# Patient Record
Sex: MALE | Race: WHITE | Employment: FULL TIME | URBAN - NONMETROPOLITAN AREA
[De-identification: names, ages, dates, MRNs, and addresses within clinical notes are randomized per-mention and may not be internally consistent; named-entity substitution may affect disease eponyms.]

---

## 2018-01-26 ENCOUNTER — HOSPITAL ENCOUNTER (OUTPATIENT)
Dept: RADIOLOGY | Facility: OTHER | Age: 28
End: 2018-01-26
Attending: FAMILY MEDICINE

## 2018-01-26 ENCOUNTER — HISTORY (OUTPATIENT)
Dept: FAMILY MEDICINE | Facility: OTHER | Age: 28
End: 2018-01-26

## 2018-01-26 ENCOUNTER — OFFICE VISIT - GICH (OUTPATIENT)
Dept: FAMILY MEDICINE | Facility: OTHER | Age: 28
End: 2018-01-26

## 2018-01-26 DIAGNOSIS — R05.9 COUGH: ICD-10-CM

## 2018-01-26 DIAGNOSIS — Z02.0 ENCOUNTER FOR EXAMINATION FOR ADMISSION TO EDUCATIONAL INSTITUTION: ICD-10-CM

## 2018-01-26 DIAGNOSIS — J18.9 PNEUMONIA: ICD-10-CM

## 2018-01-26 ASSESSMENT — ANXIETY QUESTIONNAIRES
1. FEELING NERVOUS, ANXIOUS, OR ON EDGE: NOT AT ALL
7. FEELING AFRAID AS IF SOMETHING AWFUL MIGHT HAPPEN: NOT AT ALL
2. NOT BEING ABLE TO STOP OR CONTROL WORRYING: NOT AT ALL
3. WORRYING TOO MUCH ABOUT DIFFERENT THINGS: NOT AT ALL
5. BEING SO RESTLESS THAT IT IS HARD TO SIT STILL: NOT AT ALL
6. BECOMING EASILY ANNOYED OR IRRITABLE: NOT AT ALL
4. TROUBLE RELAXING: NOT AT ALL
GAD7 TOTAL SCORE: 0

## 2018-01-26 ASSESSMENT — PATIENT HEALTH QUESTIONNAIRE - PHQ9: SUM OF ALL RESPONSES TO PHQ QUESTIONS 1-9: 0

## 2018-01-31 LAB
BORDETELLA BY RAPID PCR - HISTORICAL: NEGATIVE
BORDETELLA PARAPERTUSSIS PCR - HISTORICAL: NEGATIVE
SPECIMEN SOURCE - HISTORICAL: NORMAL

## 2018-02-02 ENCOUNTER — COMMUNICATION - GICH (OUTPATIENT)
Dept: FAMILY MEDICINE | Facility: OTHER | Age: 28
End: 2018-02-02

## 2018-02-09 ENCOUNTER — COMMUNICATION - GICH (OUTPATIENT)
Dept: FAMILY MEDICINE | Facility: OTHER | Age: 28
End: 2018-02-09

## 2018-02-09 VITALS
HEART RATE: 72 BPM | BODY MASS INDEX: 29.34 KG/M2 | HEIGHT: 68 IN | SYSTOLIC BLOOD PRESSURE: 112 MMHG | WEIGHT: 193.6 LBS | DIASTOLIC BLOOD PRESSURE: 68 MMHG

## 2018-02-11 ASSESSMENT — PATIENT HEALTH QUESTIONNAIRE - PHQ9: SUM OF ALL RESPONSES TO PHQ QUESTIONS 1-9: 0

## 2018-02-11 ASSESSMENT — ANXIETY QUESTIONNAIRES: GAD7 TOTAL SCORE: 0

## 2018-02-13 NOTE — PROGRESS NOTES
Patient Information     Patient Name MRN Sex Ruy Nation [id number] Male 1990      Progress Notes by Jacinto Currie MD at 2018  3:00 PM     Author:  Jacinto Currie MD Service:  (none) Author Type:  Physician     Filed:  2018  7:55 AM Encounter Date:  2018 Status:  Signed     :  Jacinto Currie MD (Physician)            SUBJECTIVE:    Ruy Irene is a 27 y.o. male who presents for physical cold for 2 weeks    HPI Comments: Patient arrives here for a report. He'll be participating in [employer]. He needs a form signed basically stating he can do the activity required. This form was signed. He also reports an active cough. Fevers and chills. Cold for the last 2 days. He's been in bed has not been able to get up. Fever has since resolved but the cough has persisted.  He reports dizziness. Trouble breathing. He coughs almost to the point of vomiting. He does have some exposure to pertussis. Otherwise he is fairly active. He has no problems with general physical activity. States has not been able to exercise.      No Known Allergies,   Family History       Problem   Relation Age of Onset     Diabetes        Maternal side       Hypertension        Paternal side       Hyperlipidemia        Paternal side, hyperlipidemia       Heart Disease  Maternal Grandfather      CAD, MI before age 55     ,   No current outpatient prescriptions on file prior to visit.     No current facility-administered medications on file prior to visit.    , No past surgical history on file.,   Social History      Substance Use Topics        Smoking status:  Never Smoker     Smokeless tobacco:  Never Used     Alcohol use  1.5 oz/week     3 drink(s) per week     and   Social History      Substance Use Topics        Smoking status:  Never Smoker     Smokeless tobacco:  Never Used     Alcohol use  1.5 oz/week     3 drink(s) per week        REVIEW OF SYSTEMS:  ROS    OBJECTIVE:  BP  "112/68 (Cuff Site: Right Arm, Position: Sitting, Cuff Size: Adult Large)  Pulse 72  Ht 1.727 m (5' 8\")  Wt 87.8 kg (193 lb 9.6 oz)  BMI 29.44 kg/m2    EXAM:   Physical Exam   Constitutional: He is oriented to person, place, and time and well-developed, well-nourished, and in no distress.   Patient's very well-built. Looks to be in excellent physical shape   HENT:   Rhinorrhea bilateral   Eyes: Pupils are equal, round, and reactive to light.   Neck: Normal range of motion.   Cardiovascular: Normal rate.    Pulmonary/Chest: Effort normal. He has wheezes.   Abdominal: Soft.   Musculoskeletal: Normal range of motion.   Neurological: He is alert and oriented to person, place, and time.   Psychiatric: Affect normal.       ASSESSMENT/PLAN:    ICD-10-CM    1. School health examination Z02.0    2. Coughing R05 XR CHEST 2 VIEWS PA AND LATERAL      BORDETELLA BY RAPID PCR      BORDETELLA BY RAPID PCR      azithromycin (ZITHROMAX) 250 mg tablet   3. 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 Form is signed. We'll proceed with Zithromax. Rule out pertussis. Reports he has had exposure. Follow-up when the test gets back.        "

## 2018-02-13 NOTE — NURSING NOTE
Patient Information     Patient Name MRN Ruy Cartagena [id number] Male 1990      Nursing Note by Katerina Freeman at 2018  3:00 PM     Author:  Katerina Freeman Service:  (none) Author Type:  (none)     Filed:  2018  3:02 PM Encounter Date:  2018 Status:  Signed     :  Katerina Freeman            Patient here for physical clearance for [employer] PT entrance standard. He also has an URI for the past 2 weeks. Katerina Freeman LPN .......................2018  2:58 PM

## 2018-02-13 NOTE — TELEPHONE ENCOUNTER
Patient Information     Patient Name Ruy Garcia [id number] Male 1990      Telephone Encounter by Katerina Freeman at 2018 11:21 AM     Author:  Katerina Freeman Service:  (none) Author Type:  (none)     Filed:  2018 11:22 AM Encounter Date:  2018 Status:  Signed     :  Katerina Freeman            Spoke with patient, negative result but still not feeling well. He just finished ZPak did let him know that the medication continues to work in the body for 10 days. He has to work today, but will call back early next week if not feeling any better. Katerina Freeman LPN .......................2018  11:22 AM

## 2018-02-13 NOTE — TELEPHONE ENCOUNTER
Patient Information     Patient Name MRN Ruy Cartagena [id number] Male 1990      Telephone Encounter by Annie Stevens at 2018  3:22 PM     Author:  Annie Stevens Service:  (none) Author Type:  (none)     Filed:  2018  3:25 PM Encounter Date:  2018 Status:  Signed     :  Annie Stevens            Was seen for cough, put on a z-ana and he is not feeling better, has been ten days, wondering if he could get another small dose to get him through this cold?  Annie Stevens ....................  2018   3:25 PM

## 2018-02-13 NOTE — TELEPHONE ENCOUNTER
Patient Information     Patient Name MRN Ruy Cartagena [id number] Male 1990      Telephone Encounter by Paola Colin at 2018  3:40 PM     Author:  Paola Colin Service:  (none) Author Type:  (none)     Filed:  2018  3:41 PM Encounter Date:  2018 Status:  Signed     :  Paola Colin            Patient notified to make an appointment if the cough is not getting better per Jacinto Currie MD.  Paola Colin LPN  2018  3:41 PM

## 2018-07-23 NOTE — PROGRESS NOTES
Patient Information     Patient Name  Ruy Irene MRN  [id number] Sex  Male   1990      Letter by Jacinto Currie MD at      Author:  Jacinto Currie MD Service:  (none) Author Type:  (none)    Filed:   Encounter Date:  2018 Status:  (Other)           Ruy Irene  [address] [place] [address]          2018    Dear Mr. Irene:    I did receive the report for pertussis and it did come back negative. I have included the results for your review. Please call if you should have any questions.  Results for orders placed or performed in visit on 18       BORDETELLA BY RAPID PCR       Result  Value Ref Range Status    SPECIMEN SOURCE Nasopharyngeal swab  Final    BORDETELLA BY RAPID PCR Negative Not Applicable Final    BORDETELLA PARAPERTUSSIS PCR Negative Not Applicable Final     Jacinto Currie MD ....................  2018   1:37 PM

## 2019-05-09 ENCOUNTER — OFFICE VISIT (OUTPATIENT)
Dept: FAMILY MEDICINE | Facility: OTHER | Age: 29
End: 2019-05-09
Attending: FAMILY MEDICINE
Payer: COMMERCIAL

## 2019-05-09 VITALS
SYSTOLIC BLOOD PRESSURE: 144 MMHG | RESPIRATION RATE: 20 BRPM | DIASTOLIC BLOOD PRESSURE: 76 MMHG | BODY MASS INDEX: 31.07 KG/M2 | HEART RATE: 64 BPM | TEMPERATURE: 98.8 F | HEIGHT: 68 IN | WEIGHT: 205 LBS

## 2019-05-09 DIAGNOSIS — Z11.3 SCREEN FOR STD (SEXUALLY TRANSMITTED DISEASE): Primary | ICD-10-CM

## 2019-05-09 LAB
C TRACH DNA SPEC QL PROBE+SIG AMP: NOT DETECTED
N GONORRHOEA DNA SPEC QL PROBE+SIG AMP: NOT DETECTED
SPECIMEN SOURCE: NORMAL

## 2019-05-09 PROCEDURE — 87491 CHLMYD TRACH DNA AMP PROBE: CPT | Mod: ZL | Performed by: FAMILY MEDICINE

## 2019-05-09 PROCEDURE — 87591 N.GONORRHOEAE DNA AMP PROB: CPT | Mod: ZL | Performed by: FAMILY MEDICINE

## 2019-05-09 PROCEDURE — 87389 HIV-1 AG W/HIV-1&-2 AB AG IA: CPT | Mod: ZL | Performed by: FAMILY MEDICINE

## 2019-05-09 PROCEDURE — 86696 HERPES SIMPLEX TYPE 2 TEST: CPT | Mod: ZL | Performed by: FAMILY MEDICINE

## 2019-05-09 PROCEDURE — 36415 COLL VENOUS BLD VENIPUNCTURE: CPT | Mod: ZL | Performed by: FAMILY MEDICINE

## 2019-05-09 PROCEDURE — 86695 HERPES SIMPLEX TYPE 1 TEST: CPT | Mod: ZL | Performed by: FAMILY MEDICINE

## 2019-05-09 PROCEDURE — 86780 TREPONEMA PALLIDUM: CPT | Mod: ZL | Performed by: FAMILY MEDICINE

## 2019-05-09 PROCEDURE — 99213 OFFICE O/P EST LOW 20 MIN: CPT | Performed by: FAMILY MEDICINE

## 2019-05-09 ASSESSMENT — PAIN SCALES - GENERAL: PAINLEVEL: NO PAIN (0)

## 2019-05-09 ASSESSMENT — PATIENT HEALTH QUESTIONNAIRE - PHQ9
SUM OF ALL RESPONSES TO PHQ QUESTIONS 1-9: 0
5. POOR APPETITE OR OVEREATING: NOT AT ALL

## 2019-05-09 ASSESSMENT — ANXIETY QUESTIONNAIRES
2. NOT BEING ABLE TO STOP OR CONTROL WORRYING: NOT AT ALL
7. FEELING AFRAID AS IF SOMETHING AWFUL MIGHT HAPPEN: NOT AT ALL
6. BECOMING EASILY ANNOYED OR IRRITABLE: NOT AT ALL
3. WORRYING TOO MUCH ABOUT DIFFERENT THINGS: NOT AT ALL
1. FEELING NERVOUS, ANXIOUS, OR ON EDGE: NOT AT ALL
GAD7 TOTAL SCORE: 0
5. BEING SO RESTLESS THAT IT IS HARD TO SIT STILL: NOT AT ALL

## 2019-05-09 ASSESSMENT — MIFFLIN-ST. JEOR: SCORE: 1879.37

## 2019-05-09 NOTE — NURSING NOTE
Patient here for STD testing his last testing was 2 years prior. Itching is his only symptom today. Medication Reconciliation: complete.    Katerina Freeman LPN  5/9/2019 4:28 PM

## 2019-05-09 NOTE — LETTER
May 13, 2019      Juan Ajazz Irene     [place] [address]        Dear ,    We are writing to inform you of your test results.    As you can see laboratory indicates that you have been exposed to herpes.  I still do not feel that this was a herpetic lesion.    Resulted Orders   GC/Chlamydia by PCR - [facility]   Result Value Ref Range    Specimen Source Urine     Neisseria gonorrhoreae PCR Not Detected NDET^Not Detected      Comment:      NOT DETECTED: Negative for N.gonorrhoeae genomic DNA by [facility]   real-time,reverse-transcriptase PCR. A negative result does not preclude the   presence of N.gonorrhoeae infection. The results are dependent on proper   collection,transport,processing of the specimen,and the presence of sufficient   DNA to be detected.      Chlamydia Trachomatis PCR Not Detected NDET^Not Detected      Comment:      NOTDETECTED: Negative for C.trachomatis genomic DNA by [facility]   real-time,reverse-transcriptase PCR. A negative result does not preclude the   presence of C.trachomatis infection. The results are dependent on proper   collection,transpoet,processing of specimen, and the presence of sufficient   DNA to be detected.     Herpes Simplex Virus 1 and 2 IgG   Result Value Ref Range    Herpes Simplex Virus Type 1 IgG 1.6 (H) 0.0 - 0.8 AI      Comment:      Positive.  IgG antibody to HSV-1 detected.  Antibody index (AI) values reflect qualitative changes in antibody   concentration that cannot be directly associated with clinical condition or   disease state.      Herpes Simplex Virus Type 2 IgG <0.2 0.0 - 0.8 AI      Comment:      No HSV-2 IgG antibodies detected.  Antibody index (AI) values reflect qualitative changes in antibody   concentration that cannot be directly associated with clinical condition or   disease state.     Treponema Abs w Reflex to RPR and Titer   Result Value Ref Range    Treponema Antibodies Nonreactive NR^Nonreactive       If you have any questions or  concerns, please call the clinic at the number listed above.       Sincerely,        Jacinto Currie MD

## 2019-05-10 ASSESSMENT — ANXIETY QUESTIONNAIRES: GAD7 TOTAL SCORE: 0

## 2019-05-10 NOTE — PROGRESS NOTES
"  SUBJECTIVE:   Ruy Irene is a 28 year old male who presents to clinic today for the following health issues: STD testing    Patient arrives here requesting STD testing.  He reports no known exposures but indicates that he wants everything done.  He does complain of some itching on the penis.  He is noticed one small papule that he is concerned might be a wart.  Last testing was 2 years ago.  Patient does report multiple sexual partners in the past.  He denies any high risk sexual partners such as prostitutes.  No same-sex partners of.        Patient Active Problem List    Diagnosis Date Noted     Screen for STD (sexually transmitted disease) 2019     Priority: Medium     Past Medical History:   Diagnosis Date     Single live birth          Varicella without complication           History reviewed. No pertinent surgical history.  No Known Allergies    Review of Systems     OBJECTIVE:     /76   Pulse 64   Temp 98.8  F (37.1  C)   Resp 20   Ht 1.727 m (5' 8\")   Wt 93 kg (205 lb)   BMI 31.17 kg/m    Body mass index is 31.17 kg/m .  Physical Exam   Constitutional: He appears well-developed.   HENT:   Head: Normocephalic.   Right Ear: External ear normal.   Genitourinary: Penis normal.   Genitourinary Comments: Patient does have one small papule less than a millimeter on the shaft of the penis.   Musculoskeletal: Normal range of motion.   Neurological: He is alert.       Diagnostic Test Results:  Results for orders placed or performed in visit on 19   GC/Chlamydia by PCR - [facility]   Result Value Ref Range    Specimen Source Urine     Neisseria gonorrhoreae PCR Not Detected NDET^Not Detected    Chlamydia Trachomatis PCR Not Detected NDET^Not Detected       ASSESSMENT/PLAN:         1. Screen for STD (sexually transmitted disease)  Desire for sexual's STD screening.  Reassurance is given to the patient that I felt that this was not a wart or herpes.  Patient wishes to pursue testing " anyway.  We will get back to patient as the labs returned.  - GC/Chlamydia by PCR - [facility]  - HIV Antigen Antibody Combo; Future  - Treponema Abs w Reflex to RPR and Titer; Future  - Herpes Simplex Virus 1 and 2 IgG; Future  - Herpes Simplex Virus 1 and 2 IgG  - Treponema Abs w Reflex to RPR and Titer  - HIV Antigen Antibody Combo      Jacinto Currie MD  [facility]

## 2019-05-12 LAB
HSV1 IGG SERPL QL IA: 1.6 AI (ref 0–0.8)
HSV2 IGG SERPL QL IA: <0.2 AI (ref 0–0.8)
T PALLIDUM AB SER QL: NONREACTIVE

## 2019-05-13 LAB — HIV 1+2 AB+HIV1 P24 AG SERPL QL IA: NONREACTIVE

## 2019-05-20 ENCOUNTER — TELEPHONE (OUTPATIENT)
Dept: FAMILY MEDICINE | Facility: OTHER | Age: 29
End: 2019-05-20

## 2019-05-21 NOTE — TELEPHONE ENCOUNTER
Patient calling in regards to being positive for type 1 herpes. He is wondering how he could have contracted this. I did explain this to the best of my ability. He is wondering if there is any treatment he can take preventively so he doesn't spread the virus. He is aware Dr. Currie is out of the office and will return tomorrow.     Keri Mckee LPN...................5/21/2019  10:55 AM

## 2019-05-22 NOTE — TELEPHONE ENCOUNTER
I would recommend getting information on the Internet and if he has any further questions to follow-up in clinic

## 2019-05-22 NOTE — TELEPHONE ENCOUNTER
Patient wants to know if it could have been spread to him sexually or if he could spread it to someone else through sexual activity.

## 2019-07-30 ENCOUNTER — APPOINTMENT (OUTPATIENT)
Dept: GENERAL RADIOLOGY | Facility: OTHER | Age: 29
End: 2019-07-30
Attending: EMERGENCY MEDICINE
Payer: OTHER MISCELLANEOUS

## 2019-07-30 ENCOUNTER — HOSPITAL ENCOUNTER (EMERGENCY)
Facility: OTHER | Age: 29
Discharge: HOME | End: 2019-07-30
Attending: EMERGENCY MEDICINE | Admitting: EMERGENCY MEDICINE
Payer: OTHER MISCELLANEOUS

## 2019-07-30 VITALS
OXYGEN SATURATION: 98 % | WEIGHT: 205 LBS | SYSTOLIC BLOOD PRESSURE: 128 MMHG | RESPIRATION RATE: 12 BRPM | HEART RATE: 51 BPM | TEMPERATURE: 97.1 F | BODY MASS INDEX: 31.07 KG/M2 | DIASTOLIC BLOOD PRESSURE: 88 MMHG | HEIGHT: 68 IN

## 2019-07-30 DIAGNOSIS — S62.353A CLOSED NONDISPLACED FRACTURE OF SHAFT OF THIRD METACARPAL BONE OF LEFT HAND, INITIAL ENCOUNTER: ICD-10-CM

## 2019-07-30 PROCEDURE — 99283 EMERGENCY DEPT VISIT LOW MDM: CPT | Mod: Z6 | Performed by: EMERGENCY MEDICINE

## 2019-07-30 PROCEDURE — 99283 EMERGENCY DEPT VISIT LOW MDM: CPT | Performed by: EMERGENCY MEDICINE

## 2019-07-30 PROCEDURE — 73130 X-RAY EXAM OF HAND: CPT | Mod: LT

## 2019-07-30 ASSESSMENT — ENCOUNTER SYMPTOMS
VOMITING: 0
NAUSEA: 0
ARTHRALGIAS: 1
CHILLS: 0
CHEST TIGHTNESS: 0
LIGHT-HEADEDNESS: 0
FEVER: 0
DYSURIA: 0
SHORTNESS OF BREATH: 0
AGITATION: 0

## 2019-07-30 ASSESSMENT — MIFFLIN-ST. JEOR: SCORE: 1879.37

## 2019-07-30 NOTE — ED PROVIDER NOTES
History     Chief Complaint   Patient presents with     Hand Injury     HPI  Ruy Irene is a 28 year old male who was at work today was holding a fairly heavy metal pipe and lost control of it and he want up having his hand struck between the pipe and some more metal.  He said he felt something tear.  He points to the distal third and fourth metacarpal area where he has some swelling.  He says when he just sits and does not move his hand he has minimal pain but if he tries to pick anything up he has severe sharp shooting pain.    Allergies:  No Known Allergies    Problem List:    Patient Active Problem List    Diagnosis Date Noted     Screen for STD (sexually transmitted disease) 05/09/2019     Priority: Medium        Past Medical History:    Past Medical History:   Diagnosis Date     Single live birth      Varicella without complication        Past Surgical History:    History reviewed. No pertinent surgical history.    Family History:    Family History   Problem Relation Age of Onset     Diabetes Other         Diabetes,Maternal side     Hypertension Other         Hypertension,Paternal side     Hyperlipidemia Other         Hyperlipidemia,Paternal side, hyperlipidemia     Heart Disease Maternal Grandfather         Heart Disease,CAD, MI before age 55       Social History:  Marital Status:  Single [1]  Social History     Tobacco Use     Smoking status: Current Some Day Smoker     Smokeless tobacco: Never Used   Substance Use Topics     Alcohol use: Yes     Alcohol/week: 1.5 oz     Comment: 2 a week      Drug use: Never     Types: Other     Comment: Drug use: No        Medications:      No current outpatient medications on file.      Review of Systems   Constitutional: Negative for chills and fever.   HENT: Negative for congestion.    Eyes: Negative for visual disturbance.   Respiratory: Negative for chest tightness and shortness of breath.    Cardiovascular: Negative for chest pain.   Gastrointestinal:  "Negative for nausea and vomiting.   Genitourinary: Negative for dysuria.   Musculoskeletal: Positive for arthralgias.   Skin: Negative for rash.   Neurological: Negative for light-headedness.   Psychiatric/Behavioral: Negative for agitation.       Physical Exam   BP: 128/88  Pulse: 51  Temp: 97.1  F (36.2  C)  Resp: 12  Height: 172.7 cm (5' 8\")  Weight: 93 kg (205 lb)  SpO2: 98 %      Physical Exam   Constitutional: He is oriented to person, place, and time. He appears well-developed and well-nourished. No distress.   HENT:   Head: Normocephalic and atraumatic.   Eyes: Conjunctivae are normal.   Neck: Neck supple.   Cardiovascular: Normal rate.   Pulmonary/Chest: Effort normal.   Musculoskeletal:   Left hand does have some swelling  dorsally.  Little bit of tenderness to the distal third metacarpal area.  Pain with deviation to radial side, otherwise good range of motion.  No obvious deformity.  Neurovascularly intact.   Neurological: He is alert and oriented to person, place, and time.   Skin: Skin is warm and dry. He is not diaphoretic.   Psychiatric: He has a normal mood and affect. His behavior is normal.   Nursing note and vitals reviewed.      ED Course        Procedures                 Results for orders placed or performed during the hospital encounter of 07/30/19 (from the past 24 hour(s))   XR Hand Left G/E 3 Views    Narrative    XR HAND LT G/E 3 VW    HISTORY: 28 years Male pain mid metacarpals . Pain is greatest at the  distal aspect of the third metacarpal.    COMPARISON: 9/10/2011    TECHNIQUE: 3 views left hand    FINDINGS: There is an oblique fracture, nondisplaced, nonangulated of  the third mid metacarpal.    Joint spaces are congruent. There is no evidence of dislocation or  fracture otherwise.     There is calcification or corticated ossicle along the ulnar aspect  of the second metacarpal head. There is dorsal soft tissue edema of  the hand.       Impression    IMPRESSION: Dorsal soft tissue " edema of the hand is present. There is  a subtle, nondisplaced, nonangulated obliquely oriented fracture of  the mid third metacarpal.    Corticated ossicle along the ulnar aspect of the second metacarpal  head. This may be sequela of old injury, was seen on the prior study  of 2011.    NORMAN ALARCON MD       Medications - No data to display    Assessments & Plan (with Medical Decision Making)     I have reviewed the nursing notes.    I have reviewed the findings, diagnosis, plan and need for follow up with the patient.  Fracture as above in x-ray.  He is given a splint for this hand.  He is told he should follow-up in clinic for recheck within a week's time and possible cast placement.  Return if worse.       Medication List      There are no discharge medications for this visit.         Final diagnoses:   Closed nondisplaced fracture of shaft of third metacarpal bone of left hand, initial encounter       7/30/2019   [facility]     Nba Whyte MD  07/30/19 [id number]

## 2019-07-30 NOTE — ED AVS SNAPSHOT
[facility]  [address]  [place] [address]  Phone:  [phone]  Fax:  [phone]                                    Ruy Irene   MRN: [id number]    Department:  [facility]   Date of Visit:  7/30/2019           After Visit Summary Signature Page    I have received my discharge instructions, and my questions have been answered. I have discussed any challenges I see with this plan with the nurse or doctor.    ..........................................................................................................................................  Patient/Patient Representative Signature      ..........................................................................................................................................  Patient Representative Print Name and Relationship to Patient    ..................................................               ................................................  Date                                   Time    ..........................................................................................................................................  Reviewed by Signature/Title    ...................................................              ..............................................  Date                                               Time          22EPIC Rev 08/18

## 2019-07-30 NOTE — LETTER
[facility]  [address]  [place] [address]  Phone: [phone]  Fax: [phone]    July 30, 2019        Ruy Irene  [address]   [facility] [address]          To whom it may concern:    RE: Ruy Irene  has a fracture of his hand and will need light duty until cleared in clinic.    Please contact me for questions or concerns.      Sincerely,        Nba Whyte MD  7/30/2019, 11:22 AM

## 2019-07-30 NOTE — ED TRIAGE NOTES
"ED Nursing Triage Note (General)   ________________________________    Juan Ajazz Irene is a 28 year old Male that presents to triage private car  With history of  Injuring left hand at work, unable to make a fist, strength is minimal, reported by patient   Significant symptoms had onset 1 hour(s) ago.  /88   Pulse 51   Temp 97.1  F (36.2  C) (Tympanic)   Resp 12   Ht 1.727 m (5' 8\")   Wt 93 kg (205 lb)   SpO2 98%   BMI 31.17 kg/m  t  Patient appears alert , in mild distress., and cooperative behavior.      Airway: intact  Breathing noted as Normal.  Circulation Normal  Skin normal  Action taken:  Triage to critical care immediately      PRE HOSPITAL PRIOR LIVING SITUATION Alone  "

## 2019-08-01 ENCOUNTER — OFFICE VISIT (OUTPATIENT)
Dept: FAMILY MEDICINE | Facility: OTHER | Age: 29
End: 2019-08-01
Attending: CHIROPRACTOR
Payer: OTHER MISCELLANEOUS

## 2019-08-01 VITALS
RESPIRATION RATE: 16 BRPM | DIASTOLIC BLOOD PRESSURE: 80 MMHG | BODY MASS INDEX: 31.64 KG/M2 | HEART RATE: 72 BPM | WEIGHT: 208.8 LBS | SYSTOLIC BLOOD PRESSURE: 130 MMHG | HEIGHT: 68 IN | TEMPERATURE: 98.7 F
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
## 2019-08-01 DIAGNOSIS — S62.309A CLOSED FRACTURE OF METACARPAL BONE: Primary | ICD-10-CM

## 2019-08-01 PROCEDURE — 99213 OFFICE O/P EST LOW 20 MIN: CPT | Performed by: CHIROPRACTOR

## 2019-08-01 ASSESSMENT — MIFFLIN-ST. JEOR: SCORE: 1896.61

## 2019-08-01 ASSESSMENT — PAIN SCALES - GENERAL: PAINLEVEL: MODERATE PAIN (4)

## 2019-08-01 NOTE — NURSING NOTE
Patient here to follow up on the fracture of his 3rd metacarpal in the left hand. This is a work comp injury. DATE OF INJURY: 7/30/19.  No LMP for male patient.  Medication Reconciliation: complete   Review Of Systems  Skin: negative  Eyes: negative  Ears/Nose/Throat: negative  Respiratory: No shortness of breath, dyspnea on exertion, cough, or hemoptysis  Cardiovascular: negative  Gastrointestinal: negative  Genitourinary: negative  Musculoskeletal: positive for as above  Neurologic: negative  Psychiatric: negative  Hematologic/Lymphatic/Immunologic: negative  Endocrine: negative        Katerina Hutton LPN  8/1/2019 1:07 PM

## 2019-08-01 NOTE — PROGRESS NOTES
CHIEF COMPLAINT: Ruy Irene is a 28 year old  male  Chief Complaint   Patient presents with     Work Comp     left hand fracture       HISTORY OF PRESENTING INJURY     This is a work-related injury Ruy sustained on 2019.  He works as an  at [employer].  During his shift, he was attempting to lower a 25 pound piece of metal in the shape of a long sole from a shelf contraption in front of him.  As he picked up the object and started lowering it, it slipped and he attempted to catch the object before it fell.  In doing so, his left hand became wedged between the object and the table he was working at.  He continued to working after the incident but swelling ensued and he was having difficulty lifting objects without marked pain.  Therefore he presented himself to the ED.  X-rays were obtained which showed a nondisplaced oblique fracture of the third metacarpal on his left hand.  A plastic splint was applied with Ace bandage and he was instructed follow-up with the clinic for workability and management.        PAST MEDICAL HISTORY:  Past Medical History:   Diagnosis Date     Single live birth          Varicella without complication          Patient has a history of fracture to the first metatarsal of the left hand greater than 5 years ago while playing football.  This is healed and he denies any impairment as a result.  However, on x-ray obtained on 2019, there was an accessory ossicle at the PIP joint, noncontributory to this injury.    PAST SURGICAL HISTORY:  History reviewed. No pertinent surgical history.    ALLERGIES:  No Known Allergies    CURRENT MEDICATIONS:  No current outpatient medications on file.       SOCIAL HISTORY:  Social History     Socioeconomic History     Marital status: Single     Spouse name: Not on file     Number of children: Not on file     Years of education: Not on file     Highest education level: Not on file   Occupational History     Not on  file   Social Needs     Financial resource strain: Not on file     Food insecurity:     Worry: Not on file     Inability: Not on file     Transportation needs:     Medical: Not on file     Non-medical: Not on file   Tobacco Use     Smoking status: Current Some Day Smoker     Smokeless tobacco: Never Used   Substance and Sexual Activity     Alcohol use: Yes     Alcohol/week: 1.5 oz     Comment: 2 a week      Drug use: Never     Sexual activity: Yes     Partners: Female   Lifestyle     Physical activity:     Days per week: Not on file     Minutes per session: Not on file     Stress: Not on file   Relationships     Social connections:     Talks on phone: Not on file     Gets together: Not on file     Attends Shinto service: Not on file     Active member of club or organization: Not on file     Attends meetings of clubs or organizations: Not on file     Relationship status: Not on file     Intimate partner violence:     Fear of current or ex partner: Not on file     Emotionally abused: Not on file     Physically abused: Not on file     Forced sexual activity: Not on file   Other Topics Concern     Not on file   Social History Narrative    Lives with mother, brother and step father.  Parents .   Inderjit Martinezford     Mother Kimi Deejay.     Brother Gonzalo Villalba cell #[phone]    Brother Gonzalo Alcazarnchard cell #[phone]       FAMILY HISTORY:  Family History   Problem Relation Age of Onset     Diabetes Other         Diabetes,Maternal side     Hypertension Other         Hypertension,Paternal side     Hyperlipidemia Other         Hyperlipidemia,Paternal side, hyperlipidemia     Heart Disease Maternal Grandfather         Heart Disease,CAD, MI before age 55       REVIEW OF SYSTEMS:    Nursing Notes:   Katerina Hutton LPN  8/1/2019  1:21 PM  Signed  Patient here to follow up on the fracture of his 3rd metacarpal in the left hand. This is a work comp  "injury. DATE OF INJURY: 7/30/19.  No LMP for male patient.  Medication Reconciliation: complete   Review Of Systems  Skin: negative  Eyes: negative  Ears/Nose/Throat: negative  Respiratory: No shortness of breath, dyspnea on exertion, cough, or hemoptysis  Cardiovascular: negative  Gastrointestinal: negative  Genitourinary: negative  Musculoskeletal: positive for as above  Neurologic: negative  Psychiatric: negative  Hematologic/Lymphatic/Immunologic: negative  Endocrine: negative        Katerina Hutton LPN  8/1/2019 1:07 PM    I have reviewed the ROS    PHYSICAL EXAM:   /80   Pulse 72   Temp 98.7  F (37.1  C) (Tympanic)   Resp 16   Ht 1.727 m (5' 8\")   Wt 94.7 kg (208 lb 12.8 oz)   BMI 31.75 kg/m   Body mass index is 31.75 kg/m .    General Appearance: No acute distress.  He removed the splint and Ace bandage for our observation.  There continues to be swelling on the dorsal hand.  No abrasion on the skin.      Hand:  Sensation: Intact.  Radial and ulnar blood flow:  normal.  Did not perform  strength measurement at this time.  Opposition to the thumb was able be performed and he has adequate range of motion of the wrist.    Radiographic images from his ED visit on July 30, 2019 were independently reviewed and discussed with the patient.        IMPRESSION:    Nondisplaced oblique fracture of the third metacarpal on his left hand    PLAN:    The splint applied from ED is appropriate for this injury.  We reapplied with a larger Ace bandage with instruction to him.  Follow-up in 4 weeks with x-ray to evaluate healing.  Work restrictions were applied until then.  See scanned workability form dated today.  Patient agrees with the workability and the plan going forward and his signature on the form indicating so.      Greater than 50% of this 22-minute encounter was spent in counseling and coordination of care regarding the above condition.        Kavon Lehman DC  Director - Occupational Medicine " Department  [facility]  [address] [place] [address]  Phone [phone]  Fax [phone]    Disclaimer:  This note consists of words and symbols derived from keyboarding, dictation, or using voice recognition software. As a result, there may be errors in the script that have gone undetected. Please consider this when interpreting information found in this note.    1:55 PM 8/1/2019

## 2019-08-29 ENCOUNTER — OFFICE VISIT (OUTPATIENT)
Dept: FAMILY MEDICINE | Facility: OTHER | Age: 29
End: 2019-08-29
Attending: CHIROPRACTOR
Payer: OTHER MISCELLANEOUS

## 2019-08-29 ENCOUNTER — HOSPITAL ENCOUNTER (OUTPATIENT)
Dept: GENERAL RADIOLOGY | Facility: OTHER | Age: 29
Discharge: HOME | End: 2019-08-29
Attending: CHIROPRACTOR | Admitting: CHIROPRACTOR
Payer: OTHER MISCELLANEOUS

## 2019-08-29 VITALS
RESPIRATION RATE: 16 BRPM | BODY MASS INDEX: 31.46 KG/M2 | DIASTOLIC BLOOD PRESSURE: 78 MMHG | WEIGHT: 207.6 LBS | SYSTOLIC BLOOD PRESSURE: 132 MMHG | HEIGHT: 68 IN | HEART RATE: 60 BPM | TEMPERATURE: 97.4 F

## 2019-08-29 DIAGNOSIS — S62.309A CLOSED FRACTURE OF METACARPAL BONE: Primary | ICD-10-CM
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
## 2019-08-29 DIAGNOSIS — S62.309A CLOSED FRACTURE OF METACARPAL BONE: ICD-10-CM

## 2019-08-29 PROCEDURE — 73130 X-RAY EXAM OF HAND: CPT | Mod: LT

## 2019-08-29 PROCEDURE — 99213 OFFICE O/P EST LOW 20 MIN: CPT | Performed by: CHIROPRACTOR

## 2019-08-29 ASSESSMENT — PAIN SCALES - GENERAL: PAINLEVEL: NO PAIN (1)

## 2019-08-29 ASSESSMENT — MIFFLIN-ST. JEOR: SCORE: 1883.23

## 2019-08-29 NOTE — PROGRESS NOTES
CHIEF COMPLAINT: Ruy Irene is a 28 year old  male  Chief Complaint   Patient presents with     Work Comp     L. hand fracture       HISTORY OF PRESENTING INJURY     Ruy presents today for fracture follow up.  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  He continues to wear his splint but hasn't been icing as instructed.  His workplace has been very cooperative with his restrictions.      PAST MEDICAL HISTORY:  Past Medical History:   Diagnosis Date     Single live birth          Varicella without complication            PAST SURGICAL HISTORY:  History reviewed. No pertinent surgical history.    ALLERGIES:  No Known Allergies    CURRENT MEDICATIONS:  No current outpatient medications on file.       SOCIAL HISTORY:  Social History     Socioeconomic History     Marital status: Single     Spouse name: Not on file     Number of children: Not on file     Years of education: Not on file     Highest education level: Not on file   Occupational History     Not on file   Social Needs     Financial resource strain: Not on file     Food insecurity:     Worry: Not on file     Inability: Not on file     Transportation needs:     Medical: Not on file     Non-medical: Not on file   Tobacco Use     Smoking status: Current Some Day Smoker     Smokeless tobacco: Never Used   Substance and Sexual Activity     Alcohol use: Yes     Alcohol/week: 1.5 oz     Comment: 2 a week      Drug use: Never     Sexual activity: Yes     Partners: Female   Lifestyle     Physical activity:     Days per week: Not on file     Minutes per session: Not on file     Stress: Not on file   Relationships     Social connections:     Talks on phone: Not on file     Gets together: Not on file     Attends Evangelical service: Not on file     Active member of club or organization: Not on file     Attends meetings of clubs or organizations: Not on file     " Relationship status: Not on file     Intimate partner violence:     Fear of current or ex partner: Not on file     Emotionally abused: Not on file     Physically abused: Not on file     Forced sexual activity: Not on file   Other Topics Concern     Not on file   Social History Narrative    Lives with mother, brother and step father.  Parents .   Inderjit Irene     Mother Kimi Villalba.     Brother Gonzalo Villalba cell #[phone]    Brother Gonzalo Villalba cell #[phone]       FAMILY HISTORY:  Family History   Problem Relation Age of Onset     Diabetes Other         Diabetes,Maternal side     Hypertension Other         Hypertension,Paternal side     Hyperlipidemia Other         Hyperlipidemia,Paternal side, hyperlipidemia     Heart Disease Maternal Grandfather         Heart Disease,CAD, MI before age 55       REVIEW OF SYSTEMS:    No change in ROS from 8/1/19    PHYSICAL EXAM:   /78 (BP Location: Right arm, Patient Position: Sitting, Cuff Size: Adult Regular)   Pulse 60   Temp 97.4  F (36.3  C) (Tympanic)   Resp 16   Ht 1.715 m (5' 7.5\")   Wt 94.2 kg (207 lb 9.6 oz)   BMI 32.03 kg/m   Body mass index is 32.03 kg/m .    General Appearance: No acute distress.  Continued marked swelling dorsum of left hand.    Study Result     PROCEDURE: XR HAND LT G/E 3 VW 8/29/2019 1:14 PM     HISTORY: Closed fracture of metacarpal bone; Work place accident     COMPARISONS: 7/30/2019.     TECHNIQUE: 3 views.     FINDINGS: There is a long oblique fracture through the proximal shaft  of the third metacarpal with increasing callus formation at the  fracture site.     There is old fracture of the index finger metacarpal head along the  ulnar side.                                                                        IMPRESSION: Healing third metacarpal fracture.     VERNON HERNANDEZ MD         Radiographic images were independently reviewed and discussed " with the patient.        IMPRESSION/PLAN:    I am concerned with the findings of this follow up x-ray with regards to the oblique fracture.  Therefore, placed referral to [facility] for [facility] follow up.  Will extend his restrictions as is with end date TBD based on results of OA visit.      Greater than 50% of this 24 minute encounter was spent in counseling and coordination of care regarding the above condition.        Kavon Lehman DC  Director - Occupational Medicine Department  [address] [address]  Phone [phone]  Fax [phone]    Disclaimer:  This note consists of words and symbols derived from keyboarding, dictation, or using voice recognition software. As a result, there may be errors in the script that have gone undetected. Please consider this when interpreting information found in this note.    2:05 PM 8/29/2019

## 2019-08-29 NOTE — NURSING NOTE
"Chief Complaint   Patient presents with     Work Comp     L. hand fracture       Initial /78 (BP Location: Right arm, Patient Position: Sitting, Cuff Size: Adult Regular)   Pulse 60   Temp 97.4  F (36.3  C) (Tympanic)   Resp 16   Ht 1.715 m (5' 7.5\")   Wt 94.2 kg (207 lb 9.6 oz)   BMI 32.03 kg/m   Estimated body mass index is 32.03 kg/m  as calculated from the following:    Height as of this encounter: 1.715 m (5' 7.5\").    Weight as of this encounter: 94.2 kg (207 lb 9.6 oz).  Medication Reconciliation: Completed     Kayla Bond LPN  "

## 2019-09-16 ENCOUNTER — TRANSFERRED RECORDS (OUTPATIENT)
Dept: HEALTH INFORMATION MANAGEMENT | Facility: OTHER | Age: 29
End: 2019-09-16

## 2019-10-14 DIAGNOSIS — S62.308A CLOSED FRACTURE OF 3RD METACARPAL: ICD-10-CM

## 2019-10-14 DIAGNOSIS — S62.313G: Primary | ICD-10-CM

## 2019-10-16 ENCOUNTER — OFFICE VISIT (OUTPATIENT)
Dept: ORTHOPEDICS | Facility: OTHER | Age: 29
End: 2019-10-16
Attending: ORTHOPAEDIC SURGERY
Payer: OTHER MISCELLANEOUS

## 2019-10-16 ENCOUNTER — HOSPITAL ENCOUNTER (OUTPATIENT)
Dept: GENERAL RADIOLOGY | Facility: OTHER | Age: 29
Discharge: HOME | End: 2019-10-16
Attending: ORTHOPAEDIC SURGERY | Admitting: FAMILY MEDICINE
Payer: OTHER MISCELLANEOUS

## 2019-10-16 DIAGNOSIS — Z00.00 ROUTINE GENERAL MEDICAL EXAMINATION AT A HEALTH CARE FACILITY: Primary | ICD-10-CM

## 2019-10-16 DIAGNOSIS — S62.308A CLOSED FRACTURE OF 3RD METACARPAL: ICD-10-CM

## 2019-10-16 PROCEDURE — G0463 HOSPITAL OUTPT CLINIC VISIT: HCPCS | Performed by: ORTHOPAEDIC SURGERY

## 2019-10-16 PROCEDURE — 73130 X-RAY EXAM OF HAND: CPT | Mod: LT

## 2019-10-23 NOTE — PROGRESS NOTES
CHIEF COMPLAINT: Left Third Metacarpal Fracture Recheck    PROBLEMS:  Left hand pain (ICD-729.5) (RFF97-I88.642)    PATIENT REPORTED MEDICATIONS:   Patient has no noted medications.    PATIENT REPORTED ALLERGIES:   Patient has no noted allergies.      HISTORY OF PRESENT ILLNESS:    Reason For Evaluation:   Left Hand Third Metacarpal Fracture    History:  Ruy comes kathy 2  months out from third metacarpal fracture.  She is doing well as far as that is concerned.   She really has no concerns.    She is here for new x-rays and examination at this time.    PAST MEDICAL HISTORY:    Unremarkable    PAST ORTHOPEDIC SURGICAL HISTORY:    No Past Orthopedic Surgical History    PAST SURGICAL HISTORY:    No Past Surgical History    FAMILY HISTORY:    Fraternal - Heart Attacks, Diabetes Type 2    SOCIAL HISTORY:   Employed - [employer]/    PHYSICAL EXAMINATION:    Examination of the left hand shows good range of motion, minimal pain with palpation.  Neurovascular examination is otherwise intact.    X-RAY:  Three views of the hand does show healed third metacarpal midshaft fracture.    ASSESSMENT:    Third metacarpal midshaft fracture left side, doing well    PLAN:   Release to work without limitations.    No functional limitations noted.  He has MMI.  Followup examination on a p.r.n. basis.    Dictated by Tyler Machado M.D.  D:  10/16/19  T:   10/22/19    Copy to:  Jacinto Currie MD     Typed and/or reviewed and corrected by signing  below, and sent to the Physician for final review and signature.     This report was created using voice recording software and computer-generated templates. Although every effort has been made to review for and eliminate errors, some errors may still occur.         Electronically signed by Patricia David  on 10/22/2019 at 10:57 AM    Electronically signed by Tyler Machado MD on 10/22/2019 at 11:59  AM  ________________________________________________________________________

## 2020-07-14 ENCOUNTER — VIRTUAL VISIT (OUTPATIENT)
Dept: FAMILY MEDICINE | Facility: OTHER | Age: 30
End: 2020-07-14
Attending: PHYSICIAN ASSISTANT
Payer: COMMERCIAL

## 2020-07-14 ENCOUNTER — ALLIED HEALTH/NURSE VISIT (OUTPATIENT)
Dept: FAMILY MEDICINE | Facility: OTHER | Age: 30
End: 2020-07-14
Attending: FAMILY MEDICINE
Payer: COMMERCIAL

## 2020-07-14 DIAGNOSIS — Z20.822 EXPOSURE TO COVID-19 VIRUS: Primary | ICD-10-CM

## 2020-07-14 PROCEDURE — C9803 HOPD COVID-19 SPEC COLLECT: HCPCS

## 2020-07-14 PROCEDURE — 99212 OFFICE O/P EST SF 10 MIN: CPT | Mod: TEL | Performed by: FAMILY MEDICINE

## 2020-07-14 PROCEDURE — 99207 ZZC NO CHARGE LOS: CPT

## 2020-07-14 PROCEDURE — U0003 INFECTIOUS AGENT DETECTION BY NUCLEIC ACID (DNA OR RNA); SEVERE ACUTE RESPIRATORY SYNDROME CORONAVIRUS 2 (SARS-COV-2) (CORONAVIRUS DISEASE [COVID-19]), AMPLIFIED PROBE TECHNIQUE, MAKING USE OF HIGH THROUGHPUT TECHNOLOGIES AS DESCRIBED BY CMS-2020-01-R: HCPCS | Mod: ZL | Performed by: FAMILY MEDICINE

## 2020-07-14 ASSESSMENT — PAIN SCALES - GENERAL: PAINLEVEL: NO PAIN (0)

## 2020-07-14 NOTE — NURSING NOTE
"Chief Complaint   Patient presents with     Covid 19 Testing     Patient was exposed at work to someone who was around a positive patient and his work is making them all get tested in able to return to work.    Initial There were no vitals taken for this visit. Estimated body mass index is 32.03 kg/m  as calculated from the following:    Height as of 8/29/19: 1.715 m (5' 7.5\").    Weight as of 8/29/19: 94.2 kg (207 lb 9.6 oz).    Medication Reconciliation: complete      Sarah Paredes LPN  "

## 2020-07-14 NOTE — PROGRESS NOTES
"Ruy Irene is a 29 year old male who is being evaluated via a billable telephone visit.      The patient has been notified of following: Sarah Paredes LPN .......  7/14/2020  11:25 AM        \"This telephone visit will be conducted via a call between you and your physician/provider. We have found that certain health care needs can be provided without the need for a physical exam.  This service lets us provide the care you need with a short phone conversation.  If a prescription is necessary we can send it directly to your pharmacy.  If lab work is needed we can place an order for that and you can then stop by our lab to have the test done at a later time.    Telephone visits are billed at different rates depending on your insurance coverage. During this emergency period, for some insurers they may be billed the same as an in-person visit.  Please reach out to your insurance provider with any questions.    If during the course of the call the physician/provider feels a telephone visit is not appropriate, you will not be charged for this service.\"    Patient has given verbal consent for Telephone visit?  Yes    What phone number would you like to be contacted at? [phone]    How would you like to obtain your AVS? Mail a copy  Nursing Notes:   Sarah Paredes LPN  7/14/2020 11:29 AM  Signed  Chief Complaint   Patient presents with     Covid 19 Testing     Patient was exposed at work to someone who was around a positive patient and his work is making them all get tested in able to return to work.    Initial There were no vitals taken for this visit. Estimated body mass index is 32.03 kg/m  as calculated from the following:    Height as of 8/29/19: 1.715 m (5' 7.5\").    Weight as of 8/29/19: 94.2 kg (207 lb 9.6 oz).    Medication Reconciliation: complete      Sarah Paredes LPN    Subjective     Ruy Irene is a 29 year old male who presents via phone visit today for the following health " "issues:    Told by employer he needs testing for COVID. No direct contact with known positive but required and all off work for testing.  Denies symptoms            HPI           Review of Systems   Constitutional, HEENT, cardiovascular, pulmonary, gi and gu systems are negative, except as otherwise noted.       Objective   Reported vitals:    Vital signs:                         Estimated body mass index is 32.03 kg/m  as calculated from the following:    Height as of 8/29/19: 1.715 m (5' 7.5\").    Weight as of 8/29/19: 94.2 kg (207 lb 9.6 oz).   COVID test done.      Assessment/Plan:  1. Exposure to COVID-19 virus    - Asymptomatic COVID-19 Virus (Coronavirus) by PCR    Plan:  COVID test per request of employer pending. 2-3 days to result.       Phone call duration:  5 minutes    Suzan Lucio MD      "

## 2020-07-16 LAB
SARS-COV-2 RNA SPEC QL NAA+PROBE: NOT DETECTED
SPECIMEN SOURCE: NORMAL

## 2020-11-13 ENCOUNTER — VIRTUAL VISIT (OUTPATIENT)
Dept: FAMILY MEDICINE | Facility: OTHER | Age: 30
End: 2020-11-13
Attending: PHYSICIAN ASSISTANT
Payer: COMMERCIAL

## 2020-11-13 DIAGNOSIS — Z20.822 COVID-19 RULED OUT: Primary | ICD-10-CM

## 2020-11-13 PROCEDURE — 99212 OFFICE O/P EST SF 10 MIN: CPT | Mod: TEL | Performed by: PHYSICIAN ASSISTANT

## 2020-11-13 NOTE — NURSING NOTE
Patient had 2 coworkers test positive and work is requesting him to be tested.  Marielena Funes LPN ....................  11/13/2020   12:32 PM

## 2020-11-13 NOTE — PROGRESS NOTES
"Ruy Irene is a 30 year old male who is being evaluated via a billable telephone visit.      The patient has been notified of following:     \"This telephone visit will be conducted via a call between you and your physician/provider. We have found that certain health care needs can be provided without the need for a physical exam.  This service lets us provide the care you need with a short phone conversation.  If a prescription is necessary we can send it directly to your pharmacy.  If lab work is needed we can place an order for that and you can then stop by our lab to have the test done at a later time.    Telephone visits are billed at different rates depending on your insurance coverage. During this emergency period, for some insurers they may be billed the same as an in-person visit.  Please reach out to your insurance provider with any questions.    If during the course of the call the physician/provider feels a telephone visit is not appropriate, you will not be charged for this service.\"    Patient has given verbal consent for Telephone visit?  Yes    What phone number would you like to be contacted at? [phone]    How would you like to obtain your AVS? Chloe Frey     Ruy Irene is a 30 year old male who presents via phone visit today for the following health issues:    HPI     Patient is contacted via telephone for consideration of COVID-19 testing. States he works in close contact with two co-workers who recently tested positive for COVID. One tested positive of Monday and the other on Tuesday. Patient is currently asymptomatic. No fever/chills, cough, sore throat, shortness of breath, wheezing, muscle or body aches, headaches, GI symptoms, rash.         PAST MEDICAL HISTORY:   Past Medical History:   Diagnosis Date     Single live birth          Varicella without complication            PAST SURGICAL HISTORY: No past surgical history on file.    FAMILY HISTORY: "   Family History   Problem Relation Age of Onset     Diabetes Other         Diabetes,Maternal side     Hypertension Other         Hypertension,Paternal side     Hyperlipidemia Other         Hyperlipidemia,Paternal side, hyperlipidemia     Heart Disease Maternal Grandfather         Heart Disease,CAD, MI before age 55       SOCIAL HISTORY:   Social History     Tobacco Use     Smoking status: Current Some Day Smoker     Smokeless tobacco: Never Used   Substance Use Topics     Alcohol use: Yes     Alcohol/week: 2.5 standard drinks     Comment: 2 a week       No Known Allergies  No current outpatient medications on file.     No current facility-administered medications for this visit.          Review of Systems   Constitutional, HEENT, cardiovascular, pulmonary, gi and gu systems are negative, except as otherwise noted.       Objective          Vitals:  No vitals were obtained today due to virtual visit.    healthy, alert and no distress  PSYCH: Alert and oriented times 3; coherent speech, normal   rate and volume, able to articulate logical thoughts, able   to abstract reason, no tangential thoughts, no hallucinations   or delusions  His affect is normal  RESP: No cough, no audible wheezing, able to talk in full sentences  Remainder of exam unable to be completed due to telephone visits            Assessment/Plan:  1. COVID-19 ruled out      Patient meets criteria for COVID-19 testing. They are informed to self quarantine until results are available. They have been provided information to complete curbside testing. Will notify with results. If positive, patient is to self-quarantine at home for 14 days.       Phone call duration:  5 minutes    Italia Schaefer PA-C on 11/13/2020 at 12:51 PM

## 2020-11-13 NOTE — PATIENT INSTRUCTIONS
"Discharge Instructions for COVID-19 Patients  You have--or may have--COVID-19. Please follow the instructions listed below.   If you have a weakened immune system, discuss with your doctor any other actions you need to take.  How can I protect others?  If you have symptoms (fever, cough, body aches or trouble breathing):    Stay home and away from others (self-isolate) until:  ? At least 10 days have passed since your symptoms started, And   ? You've had no fever--and no medicine that reduces fever--for 1 full day (24 hours), And    ? Your other symptoms have resolved (gotten better).  If you don't show symptoms, but testing showed that you have COVID-19:    Stay home and away from others (self-isolate). Follow the tips under \"How do I self-isolate?\" below for 10 days (20 days if you have a weak immune system).    You don't need to be retested for COVID-19 before going back to school or work. As long as you're fever-free and feeling better, you can go back to school, work and other activities after waiting the 10 or 20 days.   How do I self-isolate?    Stay in your own room, even for meals. Use your own bathroom if you can.    Stay away from others in your home. No hugging, kissing or shaking hands. No visitors.    Don't go to work, school or anywhere else.    Clean \"high touch\" surfaces often (doorknobs, counters, handles). Use household cleaning spray or wipes. You'll find a full list of  on the EPA website: www.epa.gov/pesticide-registration/list-n-disinfectants-use-against-sars-cov-2.    Cover your mouth and nose with a mask or other face covering to avoid spreading germs.    Wash your hands and face often. Use soap and water.    Caregivers in these groups are at risk for severe illness due to COVID-19:  ? People 65 years and older  ? People who live in a nursing home or long-term care facility  ? People with chronic disease (lung, heart, cancer, diabetes, kidney, liver, immunologic)  ? People who have a " weakened immune system, including those who:    Are in cancer treatment    Take medicine that weakens the immune system, such as corticosteroids    Had a bone marrow or organ transplant    Have an immune deficiency    Have poorly controlled HIV or AIDS    Are obese (body mass index of 40 or higher)    Smoke regularly    Caregivers should wear gloves while washing dishes, handling laundry and cleaning bedrooms and bathrooms.    Use caution when washing and drying laundry: Don't shake dirty laundry and use the warmest water setting that you can.    For more tips on managing your health at home, go to www.cdc.gov/coronavirus/2019-ncov/downloads/10Things.pdf.  How can I take care of myself at home?  1. Get lots of rest. Drink extra fluids (unless a doctor has told you not to).    2. Take Tylenol (acetaminophen) for fever or pain. If you have liver or kidney problems, ask your family doctor if it's okay to take Tylenol.     Adults can take either:  ? 650 mg (two 325 mg pills) every 4 to 6 hours, or   ? 1,000 mg (two 500 mg pills) every 8 hours as needed.  ? Note: Don't take more than 3,000 mg in one day. Acetaminophen is found in many medicines (both prescribed and over-the-counter medicines). Read all labels to be sure you don't take too much.   For children, check the Tylenol bottle for the right dose. The dose is based on the child's age or weight.  3. If you have other health problems (like cancer, heart failure, an organ transplant or severe kidney disease): Call your specialty clinic if you don't feel better in the next 2 days.    4. Know when to call 911. Emergency warning signs include:  ? Trouble breathing or shortness of breath  ? Pain or pressure in the chest that doesn't go away  ? Feeling confused like you haven't felt before, or not being able to wake up  ? Bluish-colored lips or face    5. Your doctor may have prescribed a blood thinner medicine. Follow their instructions.  Where can I get more  information?    [facility] - About COVID-19: [email or web address]    CDC - What to Do If You're Sick: www.cdc.gov/coronavirus/2019-ncov/about/steps-when-sick.html    CDC - Ending Home Isolation: www.cdc.gov/coronavirus/2019-ncov/hcp/disposition-in-home-patients.html    CDC - Caring for Someone: www.cdc.gov/coronavirus/2019-ncov/if-you-are-sick/care-for-someone.html    [facility] - Interim Guidance for Hospital Discharge to Home: www.health.[facility].[place]./diseases/coronavirus/hcp/hospdischarge.pdf    [facility] clinical trials (COVID-19 research studies): clinicalaffairs.[facility]-clinical-trials    Below are the COVID-19 hotlines at the [place] Department of [facility] ([facility]). Interpreters are available.  ? For health questions: Call 597-127-4917 or 1-812.826.6597 (7 a.m. to 7 p.m.)  ? For questions about schools and childcare: Call 670-269-2003 or 1-500.188.2963 (7 a.m. to 7 p.m.)    For informational purposes only. Not to replace the advice of your health care provider. Clinically reviewed by the Infection Prevention Team. Copyright   2020 [employer]. All rights reserved. [employer] [id number] - REV 08/04/20.

## 2020-11-14 ENCOUNTER — ALLIED HEALTH/NURSE VISIT (OUTPATIENT)
Dept: FAMILY MEDICINE | Facility: OTHER | Age: 30
End: 2020-11-14
Attending: PHYSICIAN ASSISTANT
Payer: COMMERCIAL

## 2020-11-14 DIAGNOSIS — Z20.822 COVID-19 RULED OUT: ICD-10-CM

## 2020-11-14 PROCEDURE — C9803 HOPD COVID-19 SPEC COLLECT: HCPCS

## 2020-11-14 PROCEDURE — U0003 INFECTIOUS AGENT DETECTION BY NUCLEIC ACID (DNA OR RNA); SEVERE ACUTE RESPIRATORY SYNDROME CORONAVIRUS 2 (SARS-COV-2) (CORONAVIRUS DISEASE [COVID-19]), AMPLIFIED PROBE TECHNIQUE, MAKING USE OF HIGH THROUGHPUT TECHNOLOGIES AS DESCRIBED BY CMS-2020-01-R: HCPCS | Mod: ZL | Performed by: PHYSICIAN ASSISTANT

## 2020-11-14 PROCEDURE — 99207 PR NO CHARGE NURSE ONLY: CPT

## 2020-11-15 LAB
SARS-COV-2 RNA SPEC QL NAA+PROBE: NOT DETECTED
SPECIMEN SOURCE: NORMAL

## 2020-12-20 ENCOUNTER — HEALTH MAINTENANCE LETTER (OUTPATIENT)
Age: 30
End: 2020-12-20

## 2021-01-25 ENCOUNTER — OFFICE VISIT (OUTPATIENT)
Dept: FAMILY MEDICINE | Facility: OTHER | Age: 31
End: 2021-01-25
Attending: PHYSICIAN ASSISTANT
Payer: COMMERCIAL

## 2021-01-25 VITALS
HEIGHT: 68 IN | HEART RATE: 62 BPM | SYSTOLIC BLOOD PRESSURE: 126 MMHG | BODY MASS INDEX: 30.62 KG/M2 | RESPIRATION RATE: 20 BRPM | OXYGEN SATURATION: 98 % | WEIGHT: 202 LBS | DIASTOLIC BLOOD PRESSURE: 80 MMHG | TEMPERATURE: 97.6 F

## 2021-01-25 DIAGNOSIS — Z11.3 ROUTINE SCREENING FOR STI (SEXUALLY TRANSMITTED INFECTION): ICD-10-CM

## 2021-01-25 DIAGNOSIS — K60.2 ANAL FISSURE: Primary | ICD-10-CM

## 2021-01-25 DIAGNOSIS — G47.30 SLEEP APNEA, UNSPECIFIED TYPE: ICD-10-CM

## 2021-01-25 DIAGNOSIS — Z11.4 ENCOUNTER FOR SCREENING FOR HIV: ICD-10-CM

## 2021-01-25 LAB
C TRACH DNA SPEC QL NAA+PROBE: NOT DETECTED
HIV1+2 AB SPEC QL IA.RAPID: NONREACTIVE
N GONORRHOEA DNA SPEC QL NAA+PROBE: NOT DETECTED
SPECIMEN SOURCE: NORMAL

## 2021-01-25 PROCEDURE — 87491 CHLMYD TRACH DNA AMP PROBE: CPT | Mod: ZL | Performed by: PHYSICIAN ASSISTANT

## 2021-01-25 PROCEDURE — 87591 N.GONORRHOEAE DNA AMP PROB: CPT | Mod: ZL | Performed by: PHYSICIAN ASSISTANT

## 2021-01-25 PROCEDURE — 36415 COLL VENOUS BLD VENIPUNCTURE: CPT | Mod: ZL | Performed by: PHYSICIAN ASSISTANT

## 2021-01-25 PROCEDURE — 99214 OFFICE O/P EST MOD 30 MIN: CPT | Performed by: PHYSICIAN ASSISTANT

## 2021-01-25 PROCEDURE — 86703 HIV-1/HIV-2 1 RESULT ANTBDY: CPT | Mod: ZL | Performed by: PHYSICIAN ASSISTANT

## 2021-01-25 RX ORDER — DOCUSATE SODIUM 250 MG
250 CAPSULE ORAL DAILY
Qty: 30 CAPSULE | Refills: 1 | Status: SHIPPED | OUTPATIENT
Start: 2021-01-25

## 2021-01-25 RX ORDER — NITROGLYCERIN 4 MG/G
1 OINTMENT RECTAL EVERY 12 HOURS
Qty: 30 G | Refills: 1 | Status: SHIPPED | OUTPATIENT
Start: 2021-01-25

## 2021-01-25 ASSESSMENT — PAIN SCALES - GENERAL: PAINLEVEL: SEVERE PAIN (6)

## 2021-01-25 ASSESSMENT — MIFFLIN-ST. JEOR: SCORE: 1855.77

## 2021-01-25 NOTE — NURSING NOTE
"Chief Complaint   Patient presents with     STD     check and abdominal pain    Patient says he has some bleeding with urination, abdominal pain ongoing and some light headedness . Reports abdominal pain has been over 1 year.     Initial /80 (BP Location: Right arm, Patient Position: Sitting, Cuff Size: Adult Large)   Pulse 62   Temp 97.6  F (36.4  C) (Temporal)   Resp 20   Ht 1.727 m (5' 8\")   Wt 91.6 kg (202 lb)   SpO2 98%   BMI 30.71 kg/m   Estimated body mass index is 30.71 kg/m  as calculated from the following:    Height as of this encounter: 1.727 m (5' 8\").    Weight as of this encounter: 91.6 kg (202 lb).  Medication Reconciliation: complete    Jocelyn Saeed LPN  "

## 2021-01-25 NOTE — PROGRESS NOTES
"SUBJECTIVE:   HPI  Ruy Irene is a 30 year old male here for multiple issues.    Anal fissure  Bright red blood in rectal pain with defecation.  This is been going on for approximately 1 year.  He has not felt any hemorrhoids or bulges in the anal area.  He has noted bright red streaking and blood in the water of the toilet.  He is already change his diet to soften his stools and avoided straining and still notes symptoms.  He has never noticed any bulging or hemorrhoids.    STI testing  Patient also requesting gonorrhea chlamydia and HIV testing as he has a new partner.  He denies any symptoms such as dysuria, hematuria, pyuria, testicular pain or pressure, abdominal pain or pressure, weakness or fatigue.    Sleep apnea  Patient also states he has a history of sleep apnea and used to have a machine with prior diagnosis.  He states in relocation approximately 2 years ago lost his machine during the moving process.  After stopping use of his machine he has not noticed any increased daytime sleepiness but is been told that he snores quite a lot.  He has also noticed after waking up from midday naps and he feels slightly short of breath and almost gasping.  He is requesting sleep medicine referral for reevaluation.    GAD7  STEFANO-7 SCORE 1/26/2018 5/9/2019   Total Score 0 0       PHQ9  PHQ 1/26/2018 5/9/2019   PHQ-9 Total Score 0 0   Q9: Thoughts of better off dead/self-harm past 2 weeks Not at all Not at all     Allergies:  No Known Allergies    Review of Systems   As above otherwise ROS is unremarkable.     OBJECTIVE:     Vitals:    01/25/21 1436   BP: 126/80   BP Location: Right arm   Patient Position: Sitting   Cuff Size: Adult Large   Pulse: 62   Resp: 20   Temp: 97.6  F (36.4  C)   TempSrc: Temporal   SpO2: 98%   Weight: 91.6 kg (202 lb)   Height: 1.727 m (5' 8\")       Physical Exam  General Appearance: Athletic build, Pleasant, alert, appropriate appearance for age and circumstances, no acute " distress  Head: Normocephalic, atraumatic  Rectal: No fissures, ulcerations, discharge on expection of the external anal sphincter.  Digital rectal exam shows severe tenderness and what feels like a fissure/groove along the posterior dorsal aspect/12 o'clock position.  No blood on the glove, no masses or hemorrhoids noted.  Psychiatric Exam: Alert and oriented, appropriate affect    ASSESSMENT/PLAN:       ICD-10-CM    1. Anal fissure  K60.2 nitroGLYcerin (RECTIV) 0.4 % OINT rectal ointment     docusate sodium (COLACE) 250 MG capsule   2. Sleep apnea, unspecified type  G47.30 SLEEP EVALUATION & MANAGEMENT REFERRAL - [facility] [phone] (Age 13 and up)   3. Encounter for screening for HIV  Z11.4 HIV Rapid Antibody Screen   4. Routine screening for STI (sexually transmitted infection)  Z11.3 GC/Chlamydia by PCR       Suspect patient is suffering from an anal fissure.  Recommend nitroglycerin ointment as well as Doculase stool softener.  Treatment up to 8 weeks according to up-to-date.  If symptoms persist or worsen he will follow-up as needed.    For sleep apnea, sleep medicine referral replaced.  He is already had a machine but lost it in the recent move.  He is requesting referral instead of DME order for supplies as he is wondering if he still has this diagnosis.  I suspect he does due to daytime sleepiness, snoring, and waking up gasping.    Awaiting STD screening and will follow up with results.    Patient will follow up for the development of new, ongoing, or worse symptoms.    Orders Placed This Encounter   Procedures     HIV Rapid Antibody Screen     SLEEP EVALUATION & MANAGEMENT REFERRAL - [facility] [phone] (Age 13 and up)     Return if symptoms worsen or fail to improve.    A total of 30 minutes was spent with the patient, reviewing records, tests, ordering medications, tests or procedures, specialty referral,   review, and documenting clinical information in the EHR.  We reviewed multiple problems with high complexity of data.    EMILY Mensah  [facility] [place] [facility]    This document was prepared using voice generated software.  While every attempt was made for accuracy, grammatical errors may exist.

## 2021-01-27 NOTE — RESULT ENCOUNTER NOTE
Please notify patient of their results.  Let me know if they have any questions.    Thank you!    EMILY Durand

## 2021-01-28 ENCOUNTER — TELEPHONE (OUTPATIENT)
Dept: FAMILY MEDICINE | Facility: OTHER | Age: 31
End: 2021-01-28

## 2021-01-28 NOTE — TELEPHONE ENCOUNTER
Called Pt and after proper verification notified Pt of negative test results. Pt stated he understood and had no questions at this time.  Skye Allred LPN on 1/28/2021 at 9:17 AM

## 2021-10-03 ENCOUNTER — HEALTH MAINTENANCE LETTER (OUTPATIENT)
Age: 31
End: 2021-10-03

## 2022-01-23 ENCOUNTER — HEALTH MAINTENANCE LETTER (OUTPATIENT)
Age: 32
End: 2022-01-23

## 2022-09-04 ENCOUNTER — HEALTH MAINTENANCE LETTER (OUTPATIENT)
Age: 32
End: 2022-09-04

## 2023-04-30 ENCOUNTER — HEALTH MAINTENANCE LETTER (OUTPATIENT)
Age: 33
End: 2023-04-30

## 2025-01-11 ENCOUNTER — APPOINTMENT (OUTPATIENT)
Dept: GENERAL RADIOLOGY | Facility: OTHER | Age: 35
End: 2025-01-11
Attending: FAMILY MEDICINE
Payer: COMMERCIAL

## 2025-01-11 ENCOUNTER — HOSPITAL ENCOUNTER (EMERGENCY)
Facility: OTHER | Age: 35
Discharge: HOME | End: 2025-01-12
Attending: FAMILY MEDICINE
Payer: COMMERCIAL

## 2025-01-11 VITALS
OXYGEN SATURATION: 97 % | RESPIRATION RATE: 16 BRPM | HEIGHT: 69 IN | DIASTOLIC BLOOD PRESSURE: 108 MMHG | WEIGHT: 200 LBS | HEART RATE: 75 BPM | BODY MASS INDEX: 29.62 KG/M2 | SYSTOLIC BLOOD PRESSURE: 157 MMHG | TEMPERATURE: 98 F
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
## 2025-01-11 DIAGNOSIS — M54.41 ACUTE RIGHT-SIDED LOW BACK PAIN WITH RIGHT-SIDED SCIATICA: ICD-10-CM

## 2025-01-11 PROCEDURE — 72100 X-RAY EXAM L-S SPINE 2/3 VWS: CPT

## 2025-01-11 PROCEDURE — 99283 EMERGENCY DEPT VISIT LOW MDM: CPT | Performed by: FAMILY MEDICINE

## 2025-01-11 PROCEDURE — 99284 EMERGENCY DEPT VISIT MOD MDM: CPT | Performed by: FAMILY MEDICINE

## 2025-01-11 PROCEDURE — 250N000013 HC RX MED GY IP 250 OP 250 PS 637: Performed by: FAMILY MEDICINE

## 2025-01-11 PROCEDURE — 96372 THER/PROPH/DIAG INJ SC/IM: CPT | Performed by: FAMILY MEDICINE

## 2025-01-11 PROCEDURE — 250N000011 HC RX IP 250 OP 636: Performed by: FAMILY MEDICINE

## 2025-01-11 RX ORDER — LIDOCAINE 4 G/G
1 PATCH TOPICAL ONCE
Status: DISCONTINUED | OUTPATIENT
Start: 2025-01-11 | End: 2025-01-12 | Stop reason: HOSPADM

## 2025-01-11 RX ORDER — KETOROLAC TROMETHAMINE 30 MG/ML
30 INJECTION, SOLUTION INTRAMUSCULAR; INTRAVENOUS ONCE
Status: COMPLETED | OUTPATIENT
Start: 2025-01-11 | End: 2025-01-11

## 2025-01-11 RX ORDER — ACETAMINOPHEN 500 MG
1000 TABLET ORAL ONCE
Status: COMPLETED | OUTPATIENT
Start: 2025-01-11 | End: 2025-01-11

## 2025-01-11 RX ORDER — LIDOCAINE 50 MG/G
1 PATCH TOPICAL EVERY 24 HOURS
Qty: 9 PATCH | Refills: 0 | Status: SHIPPED | OUTPATIENT
Start: 2025-01-11

## 2025-01-11 RX ADMIN — ACETAMINOPHEN 1000 MG: 500 TABLET, FILM COATED ORAL at 23:33

## 2025-01-11 RX ADMIN — LIDOCAINE 1 PATCH: 4 PATCH TOPICAL at 23:56

## 2025-01-11 RX ADMIN — KETOROLAC TROMETHAMINE 30 MG: 30 INJECTION, SOLUTION INTRAMUSCULAR at 23:33

## 2025-01-11 ASSESSMENT — ENCOUNTER SYMPTOMS: BACK PAIN: 1

## 2025-01-11 ASSESSMENT — COLUMBIA-SUICIDE SEVERITY RATING SCALE - C-SSRS
6. HAVE YOU EVER DONE ANYTHING, STARTED TO DO ANYTHING, OR PREPARED TO DO ANYTHING TO END YOUR LIFE?: NO
2. HAVE YOU ACTUALLY HAD ANY THOUGHTS OF KILLING YOURSELF IN THE PAST MONTH?: NO
1. IN THE PAST MONTH, HAVE YOU WISHED YOU WERE DEAD OR WISHED YOU COULD GO TO SLEEP AND NOT WAKE UP?: NO
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
## 2025-01-12 NOTE — DISCHARGE INSTRUCTIONS
[place]    I recommend Tylenol and ibuprofen. If the patch seems to help you can use the prescription to get more of them.     Thank you for choosing our Emergency Department for your care.     You may receive a phone call or letter for a survey about your care in our ED.  Please complete this as this is how we improve care for our patients.     If you have any questions after leaving the ED you can call or text me on my cell phone at [phone].  I am not on call so if I do not answer my phone please leave a message- I will get back to you.  If you are not doing well please return to the ED.     Sincerely,    Dr Moisés Mukherjee M.D.  Medical Director  [facility] Emergency Department
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
## 2025-02-09 ENCOUNTER — HEALTH MAINTENANCE LETTER (OUTPATIENT)
Age: 35
End: 2025-02-09

## 2025-03-19 ENCOUNTER — OFFICE VISIT (OUTPATIENT)
Dept: FAMILY MEDICINE | Facility: OTHER | Age: 35
End: 2025-03-19
Attending: FAMILY MEDICINE
Payer: COMMERCIAL

## 2025-03-19 VITALS
SYSTOLIC BLOOD PRESSURE: 133 MMHG | OXYGEN SATURATION: 96 % | TEMPERATURE: 96.5 F | WEIGHT: 210.4 LBS | RESPIRATION RATE: 12 BRPM | HEIGHT: 69 IN | DIASTOLIC BLOOD PRESSURE: 81 MMHG | BODY MASS INDEX: 31.16 KG/M2 | HEART RATE: 62 BPM

## 2025-03-19 DIAGNOSIS — M54.50 CHRONIC RIGHT-SIDED LOW BACK PAIN WITHOUT SCIATICA: Primary | ICD-10-CM

## 2025-03-19 DIAGNOSIS — G89.29 CHRONIC RIGHT-SIDED LOW BACK PAIN WITHOUT SCIATICA: Primary | ICD-10-CM

## 2025-03-19 RX ORDER — CYCLOBENZAPRINE HCL 10 MG
10 TABLET ORAL 3 TIMES DAILY PRN
Qty: 30 TABLET | Refills: 2 | Status: SHIPPED | OUTPATIENT
Start: 2025-03-19 | End: 2025-06-17

## 2025-03-19 RX ORDER — MELOXICAM 15 MG/1
15 TABLET ORAL DAILY
Qty: 90 TABLET | Refills: 0 | Status: SHIPPED | OUTPATIENT
Start: 2025-03-19 | End: 2025-06-17

## 2025-03-19 ASSESSMENT — PAIN SCALES - PAIN ENJOYMENT GENERAL ACTIVITY SCALE (PEG)
INTERFERED_GENERAL_ACTIVITY: 5
INTERFERED_GENERAL_ACTIVITY: 5
INTERFERED_ENJOYMENT_LIFE: 5
INTERFERED_ENJOYMENT_LIFE: 5
AVG_PAIN_PASTWEEK: 6
AVG_PAIN_PASTWEEK: 6
PEG_TOTALSCORE: 5.33
PEG_TOTALSCORE: 5.33

## 2025-03-19 ASSESSMENT — ENCOUNTER SYMPTOMS
BACK PAIN: 1
RESPIRATORY NEGATIVE: 1
CARDIOVASCULAR NEGATIVE: 1
CONSTITUTIONAL NEGATIVE: 1

## 2025-03-19 ASSESSMENT — PAIN SCALES - GENERAL: PAINLEVEL_OUTOF10: MODERATE PAIN (6)
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
## 2025-03-19 NOTE — PROGRESS NOTES
Assessment & Plan   Problem List Items Addressed This Visit    None  Visit Diagnoses       Chronic right-sided low back pain without sciatica    -  Primary    Relevant Medications    meloxicam (MOBIC) 15 MG tablet    cyclobenzaprine (FLEXERIL) 10 MG tablet    Other Relevant Orders    Physical Therapy  Referral              Diagnoses and all orders for this visit:  Chronic right-sided low back pain without sciatica  -     Physical Therapy  Referral; Future  -     meloxicam (MOBIC) 15 MG tablet; Take 1 tablet (15 mg) by mouth daily.  -     cyclobenzaprine (FLEXERIL) 10 MG tablet; Take 1 tablet (10 mg) by mouth 3 times daily as needed for muscle spasms.              Subjective   Dahcota HARRISON Pantoja A 34 year old male    History of Present Illness       Back Pain:  He presents for follow up of back pain. Patient's back pain is a chronic problem.  Location of back pain:  Right side of waist  Description of back pain: dull ache  Back pain spreads: right buttocks    Since patient first noticed back pain, pain is: unchanged  Does back pain interfere with his job:  Yes      He is taking medications regularly.    Past Medical History:   Diagnosis Date    Single live birth         Varicella without complication           reports that he has been smoking cigarettes. He started smoking about 25 years ago. He has never used smokeless tobacco. He reports current alcohol use of about 2.5 standard drinks of alcohol per week. He reports that he does not use drugs.  Family History   Problem Relation Age of Onset    Diabetes Other         Diabetes,Maternal side    Hypertension Other         Hypertension,Paternal side    Hyperlipidemia Other         Hyperlipidemia,Paternal side, hyperlipidemia    Heart Disease Maternal Grandfather         Heart Disease,CAD, MI before age 55     No Known Allergies    Current Outpatient Medications:     cyclobenzaprine (FLEXERIL) 10 MG tablet, Take 1 tablet (10 mg) by mouth 3  "times daily as needed for muscle spasms., Disp: 30 tablet, Rfl: 2    meloxicam (MOBIC) 15 MG tablet, Take 1 tablet (15 mg) by mouth daily., Disp: 90 tablet, Rfl: 0    docusate sodium (COLACE) 250 MG capsule, Take 1 capsule (250 mg) by mouth daily (Patient not taking: Reported on 3/19/2025), Disp: 30 capsule, Rfl: 1    lidocaine (LIDODERM) 5 % patch, Place 1 patch over 12 hours onto the skin every 24 hours. To prevent lidocaine toxicity, patient should be patch free for 12 hrs daily. (Patient not taking: Reported on 3/19/2025), Disp: 9 patch, Rfl: 0    nitroGLYcerin (RECTIV) 0.4 % OINT rectal ointment, Place 1 inch (1.5 mg) rectally every 12 hours (Patient not taking: Reported on 3/19/2025), Disp: 30 g, Rfl: 1  Review of Systems   Constitutional: Negative.    HENT: Negative.     Cardiovascular: Negative.    Musculoskeletal:  Positive for back pain.   Skin: Negative.          Objective      /81 (BP Location: Right arm, Patient Position: Sitting, Cuff Size: Adult Regular)   Pulse 62   Temp (!) 96.5  F (35.8  C) (Tympanic)   Resp 12   Ht 1.74 m (5' 8.5\")   Wt 95.4 kg (210 lb 6.4 oz)   SpO2 96%   BMI 31.53 kg/m    Body mass index is 31.53 kg/m .  Physical Exam  Constitutional:       Appearance: Normal appearance.   Pulmonary:      Effort: Pulmonary effort is normal.   Musculoskeletal:      Comments: Has point tenderness over the right paraspinal muscles around L5.  Rotation towards left and sidebending towards the left causes increased pain rotation and sidebending towards the right do not cause increased pain.  Straight leg raise is negative bilaterally.  Muscle strength is 5 out of 5 for hip flexion right and left side muscle strength is 5 out of 5 for flexion and extension at the knee but bilaterally.  And muscle strength is 5 out of 5 for dorsiflexion and plantarflexion bilaterally.   Skin:     General: Skin is warm and dry.   Neurological:      General: No focal deficit present.      Mental Status: He " "is alert and oriented to person, place, and time.      Comments: Patient has no radicular pain.  Straight leg raise is negative bilaterally   Psychiatric:         Mood and Affect: Mood normal.         Behavior: Behavior normal.         No results found for: \"WBC\", \"HGB\", \"PLT\", \"CHOL\", \"TRIG\", \"HDL\", \"LDLDIRECT\", \"ALT\", \"AST\", \"NA\", \"CREATININE\", \"BUN\", \"CO2\", \"TSH\", \"PSA\", \"HGBA1C\", \"MICROALBUR\"    Office Visit on 2021   Component Date Value Ref Range Status    Specimen Source 2021 Midstream Urine   Final    Neisseria gonorrhoreae PCR 2021 Not Detected  NDET^Not Detected Final    Comment: NOT DETECTED: Negative for N.gonorrhoeae genomic DNA by [employer]   real-time,reverse-transcriptase PCR. A negative result does not preclude the   presence of N.gonorrhoeae infection. The results are dependent on proper   collection,transport,processing of the specimen,and the presence of sufficient   DNA to be detected.      Chlamydia Trachomatis PCR 2021 Not Detected  NDET^Not Detected Final    Comment: NOTDETECTED: Negative for C.trachomatis genomic DNA by [employer]   real-time,reverse-transcriptase PCR. A negative result does not preclude the   presence of C.trachomatis infection. The results are dependent on proper   collection,transpoet,processing of specimen, and the presence of sufficient   DNA to be detected.      HIV Rapid Antibody Screen 2021 Nonreactive  NR^Nonreactive Final    Comment: Nonreactive for HIV-1 and/or HIV-2 antibodies.  All rapid HIV 1/2 Ab (3rd Generation) tests must be confirmed by a second   non-rapid HIV 1/2 Ag/Ab Combination (4th Generation) test.  This assay has not   been evaluated for  screening, cord blood specimens or individuals   less than 12 years of age.           No results for input(s): \"TSH\", \"UA\", \"PSA\", \"HGBA1C\" in the last 336 hours.    Invalid input(s): \"CBC\", \"CMP\", \"LIPIDS\", \"BMP\", \"MICROALBU\"       "

## 2025-03-19 NOTE — NURSING NOTE
"Chief Complaint   Patient presents with    Follow Up     Lower Back         Initial /81 (BP Location: Right arm, Patient Position: Sitting, Cuff Size: Adult Regular)   Pulse 62   Temp (!) 96.5  F (35.8  C) (Tympanic)   Resp 12   Ht 1.74 m (5' 8.5\")   Wt 95.4 kg (210 lb 6.4 oz)   SpO2 96%   BMI 31.53 kg/m   Estimated body mass index is 31.53 kg/m  as calculated from the following:    Height as of this encounter: 1.74 m (5' 8.5\").    Weight as of this encounter: 95.4 kg (210 lb 6.4 oz).  Medication Review: complete    The next two questions are to help us understand your food security.  If you are feeling you need any assistance in this area, we have resources available to support you today.           No data to display                  Health Care Directive:  Patient does not have a Health Care Directive: Discussed advance care planning with patient; however, patient declined at this time.    Dorothea Sharpe      "

## 2025-04-10 ENCOUNTER — THERAPY VISIT (OUTPATIENT)
Dept: PHYSICAL THERAPY | Facility: OTHER | Age: 35
End: 2025-04-10
Attending: FAMILY MEDICINE
Payer: COMMERCIAL

## 2025-04-10 DIAGNOSIS — G89.29 CHRONIC RIGHT-SIDED LOW BACK PAIN WITHOUT SCIATICA: ICD-10-CM

## 2025-04-10 DIAGNOSIS — M54.50 CHRONIC RIGHT-SIDED LOW BACK PAIN WITHOUT SCIATICA: ICD-10-CM

## 2025-04-10 PROCEDURE — 97140 MANUAL THERAPY 1/> REGIONS: CPT | Mod: GP | Performed by: PHYSICAL THERAPIST

## 2025-04-10 PROCEDURE — 97161 PT EVAL LOW COMPLEX 20 MIN: CPT | Mod: GP | Performed by: PHYSICAL THERAPIST

## 2025-04-10 PROCEDURE — 97110 THERAPEUTIC EXERCISES: CPT | Mod: GP | Performed by: PHYSICAL THERAPIST
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
## 2025-07-24 ENCOUNTER — OFFICE VISIT (OUTPATIENT)
Dept: FAMILY MEDICINE | Facility: OTHER | Age: 35
End: 2025-07-24
Attending: FAMILY MEDICINE
Payer: COMMERCIAL

## 2025-07-24 VITALS
SYSTOLIC BLOOD PRESSURE: 122 MMHG | WEIGHT: 198 LBS | BODY MASS INDEX: 29.67 KG/M2 | OXYGEN SATURATION: 96 % | DIASTOLIC BLOOD PRESSURE: 88 MMHG | RESPIRATION RATE: 16 BRPM | TEMPERATURE: 97.1 F | HEART RATE: 68 BPM

## 2025-07-24 DIAGNOSIS — M62.830 LUMBAR PARASPINAL MUSCLE SPASM: ICD-10-CM

## 2025-07-24 DIAGNOSIS — M54.17 LUMBOSACRAL RADICULOPATHY AT S1: Primary | ICD-10-CM
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
## 2025-07-24 ASSESSMENT — PAIN SCALES - GENERAL: PAINLEVEL_OUTOF10: MODERATE PAIN (6)
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
## 2025-08-01 ENCOUNTER — HOSPITAL ENCOUNTER (OUTPATIENT)
Dept: MRI IMAGING | Facility: OTHER | Age: 35
Discharge: HOME | End: 2025-08-01
Attending: FAMILY MEDICINE | Admitting: FAMILY MEDICINE
Payer: COMMERCIAL

## 2025-08-01 DIAGNOSIS — M54.17 LUMBOSACRAL RADICULOPATHY AT S1: ICD-10-CM

## 2025-08-01 PROCEDURE — 72148 MRI LUMBAR SPINE W/O DYE: CPT

## 2025-08-01 PROCEDURE — 72148 MRI LUMBAR SPINE W/O DYE: CPT | Mod: 26 | Performed by: RADIOLOGY

## 2025-08-04 ENCOUNTER — RESULTS FOLLOW-UP (OUTPATIENT)
Dept: FAMILY MEDICINE | Facility: OTHER | Age: 35
End: 2025-08-04
Payer: COMMERCIAL

## 2025-08-04 DIAGNOSIS — M54.17 LUMBOSACRAL RADICULOPATHY AT S1: Primary | ICD-10-CM

## (undated) RX ORDER — KETOROLAC TROMETHAMINE 30 MG/ML
INJECTION, SOLUTION INTRAMUSCULAR; INTRAVENOUS
Status: DISPENSED
Start: 2025-01-11

## (undated) RX ORDER — LIDOCAINE 4 G/G
PATCH TOPICAL
Status: DISPENSED
Start: 2025-01-11

## (undated) RX ORDER — ACETAMINOPHEN 500 MG
TABLET ORAL
Status: DISPENSED
Start: 2025-01-11